# Patient Record
Sex: FEMALE | Race: WHITE | URBAN - METROPOLITAN AREA
[De-identification: names, ages, dates, MRNs, and addresses within clinical notes are randomized per-mention and may not be internally consistent; named-entity substitution may affect disease eponyms.]

---

## 2022-09-14 ENCOUNTER — WEB ENCOUNTER (OUTPATIENT)
Dept: URBAN - METROPOLITAN AREA CLINIC 96 | Facility: CLINIC | Age: 72
End: 2022-09-14

## 2022-09-14 ENCOUNTER — OFFICE VISIT (OUTPATIENT)
Dept: URBAN - METROPOLITAN AREA CLINIC 96 | Facility: CLINIC | Age: 72
End: 2022-09-14
Payer: MEDICARE

## 2022-09-14 ENCOUNTER — TELEPHONE ENCOUNTER (OUTPATIENT)
Dept: URBAN - METROPOLITAN AREA CLINIC 92 | Facility: CLINIC | Age: 72
End: 2022-09-14

## 2022-09-14 VITALS
SYSTOLIC BLOOD PRESSURE: 146 MMHG | HEART RATE: 67 BPM | BODY MASS INDEX: 24.32 KG/M2 | TEMPERATURE: 98 F | DIASTOLIC BLOOD PRESSURE: 83 MMHG | HEIGHT: 65 IN | WEIGHT: 146 LBS

## 2022-09-14 DIAGNOSIS — K58.0 IRRITABLE BOWEL SYNDROME WITH DIARRHEA: ICD-10-CM

## 2022-09-14 DIAGNOSIS — R68.81 EARLY SATIETY: ICD-10-CM

## 2022-09-14 DIAGNOSIS — R10.13 DYSPEPSIA: ICD-10-CM

## 2022-09-14 DIAGNOSIS — R14.0 BLOATING: ICD-10-CM

## 2022-09-14 PROCEDURE — 99204 OFFICE O/P NEW MOD 45 MIN: CPT | Performed by: INTERNAL MEDICINE

## 2022-09-14 RX ORDER — ESTRADIOL 1 MG/1
1 TABLET TABLET ORAL ONCE A DAY
Status: ACTIVE | COMMUNITY

## 2022-09-14 RX ORDER — HYOSCYAMINE SULFATE 0.12 MG/1
1 TABLET AS NEEDED TABLET ORAL
Qty: 45 TABLET | Refills: 1 | OUTPATIENT
Start: 2022-09-14 | End: 2022-11-12

## 2022-09-14 NOTE — HPI-TODAY'S VISIT:
Patient being seen in consultation as requested by Dr. Lakeisha Russell.  A copy of this document will be sent to the requesting physician.  73 yo female reports bloating and early satiety. "I just need help". Reports will have diarrhea intermittently. Last colonoscopy 2018 with outside physician to follow up abnormal Cologuard, unable to complete exam and follow up CT colonography was unremarkable with hepatic hemangioma noted on follow up liver MRI.   Patient takes daily probiotic, avoids gluten intake given will cause diarrhea. Will have cramping followed by diarrhea. Will panic with traveling. No rectal bleeding, no unintentional weight loss. No melena, no f/c, no jaundice. No nocturnal symptoms.   No prior celiac disease testing. GFD for years.   No caffeine, no alcohol, avoids cheeses, drinks lactose free milk. Avoids certain vegetables.  Hx of urinary spasm, followed by urologist. No hx of difficulty with urine flow. No glaucoma.

## 2022-10-25 ENCOUNTER — OFFICE VISIT (OUTPATIENT)
Dept: URBAN - METROPOLITAN AREA CLINIC 96 | Facility: CLINIC | Age: 72
End: 2022-10-25
Payer: MEDICARE

## 2022-10-25 ENCOUNTER — TELEPHONE ENCOUNTER (OUTPATIENT)
Dept: URBAN - METROPOLITAN AREA CLINIC 96 | Facility: CLINIC | Age: 72
End: 2022-10-25

## 2022-10-25 ENCOUNTER — WEB ENCOUNTER (OUTPATIENT)
Dept: URBAN - METROPOLITAN AREA CLINIC 96 | Facility: CLINIC | Age: 72
End: 2022-10-25

## 2022-10-25 ENCOUNTER — DASHBOARD ENCOUNTERS (OUTPATIENT)
Age: 72
End: 2022-10-25

## 2022-10-25 VITALS
DIASTOLIC BLOOD PRESSURE: 66 MMHG | WEIGHT: 143 LBS | BODY MASS INDEX: 23.82 KG/M2 | TEMPERATURE: 94.5 F | SYSTOLIC BLOOD PRESSURE: 123 MMHG | HEIGHT: 65 IN | HEART RATE: 70 BPM

## 2022-10-25 DIAGNOSIS — K58.0 IRRITABLE BOWEL SYNDROME WITH DIARRHEA: ICD-10-CM

## 2022-10-25 DIAGNOSIS — R14.0 BLOATING: ICD-10-CM

## 2022-10-25 PROBLEM — 197125005: Status: ACTIVE | Noted: 2022-09-14

## 2022-10-25 PROCEDURE — 99213 OFFICE O/P EST LOW 20 MIN: CPT

## 2022-10-25 RX ORDER — HYOSCYAMINE SULFATE 0.12 MG/1
1 TABLET AS NEEDED TABLET ORAL
Qty: 45 TABLET | Refills: 1 | Status: ACTIVE | COMMUNITY
Start: 2022-09-14 | End: 2022-11-12

## 2022-10-25 RX ORDER — ESTRADIOL 1 MG/1
1 TABLET TABLET ORAL ONCE A DAY
Status: ACTIVE | COMMUNITY

## 2022-10-25 NOTE — HPI-TODAY'S VISIT:
Patient is a 72-year-old female presents to follow-up from recent office visit with Dr. Johnson on 9/14/2022.   She initially presented with early satiety and intermittent diarrhea.   Last colonoscopy 2018 with outside physician after an abnormal Cologuard.  She was unable to complete exam and had a follow-up CT colonography which was unremarkable.  Hepatic hemangioma noted on follow-up liver MRI.  Known trigger for diarrhea includes gluten and has been gluten-free for years.   At last visit she was advised to attempt the FODMAP diet.   Asked to try probiotics and IBgard as needed.  Also provided with Levsin for abdominal bloating. Has removed chocolate and soy from diet which she feels has improved symptoms  She took Levsin which did improve symptoms Currently taking one once a day Tried IBgard and does not believe it helped so it was discontinued  Patient states the last couple of days she had had solid stools described as soft  Denies BRBPR or melena Denies current complaint sof abdominal pain, early satiety Denies N/V Denies dysphagia  Denies unintentional weight loss  Denies fever or chills

## 2022-11-01 ENCOUNTER — TELEPHONE ENCOUNTER (OUTPATIENT)
Dept: URBAN - METROPOLITAN AREA CLINIC 96 | Facility: CLINIC | Age: 72
End: 2022-11-01

## 2022-11-02 ENCOUNTER — TELEPHONE ENCOUNTER (OUTPATIENT)
Dept: URBAN - METROPOLITAN AREA CLINIC 96 | Facility: CLINIC | Age: 72
End: 2022-11-02

## 2022-11-02 ENCOUNTER — ERX REFILL RESPONSE (OUTPATIENT)
Dept: URBAN - METROPOLITAN AREA CLINIC 96 | Facility: CLINIC | Age: 72
End: 2022-11-02

## 2022-11-02 RX ORDER — HYOSCYAMINE SULFATE 0.12 MG/1
1 TABLET TABLET ORAL
Qty: 90 | Refills: 3
Start: 2022-09-14 | End: 2023-03-02

## 2022-11-02 RX ORDER — HYOSCYAMINE SULFATE 0.12 MG/1
TAKE 1 TABLET BY MOUTH EVERY 4 HOURS AS NEEDED TABLET ORAL
Qty: 45 TABLET | Refills: 0 | OUTPATIENT

## 2022-12-14 ENCOUNTER — TELEPHONE ENCOUNTER (OUTPATIENT)
Dept: URBAN - METROPOLITAN AREA CLINIC 96 | Facility: CLINIC | Age: 72
End: 2022-12-14

## 2022-12-14 RX ORDER — HYOSCYAMINE SULFATE 0.12 MG/1
TAKE 1 TABLET BY MOUTH EVERY 4 HOURS AS NEEDED TABLET ORAL
Qty: 45 TABLET | Refills: 4

## 2025-07-21 ENCOUNTER — OFFICE VISIT (OUTPATIENT)
Dept: URBAN - METROPOLITAN AREA CLINIC 96 | Facility: CLINIC | Age: 75
End: 2025-07-21
Payer: MEDICARE

## 2025-07-21 DIAGNOSIS — Z86.0101 H/O ADENOMATOUS POLYP OF COLON: ICD-10-CM

## 2025-07-21 DIAGNOSIS — R19.7 DIARRHEA, UNSPECIFIED TYPE: ICD-10-CM

## 2025-07-21 DIAGNOSIS — C55 MALIGNANT NEOPLASM OF UTERUS, UNSPECIFIED SITE: ICD-10-CM

## 2025-07-21 PROBLEM — 371973000: Status: ACTIVE | Noted: 2025-07-21

## 2025-07-21 PROCEDURE — 99214 OFFICE O/P EST MOD 30 MIN: CPT | Performed by: INTERNAL MEDICINE

## 2025-07-21 PROCEDURE — 99204 OFFICE O/P NEW MOD 45 MIN: CPT | Performed by: INTERNAL MEDICINE

## 2025-07-21 RX ORDER — ESTRADIOL 1 MG/1
1 TABLET TABLET ORAL ONCE A DAY
Status: DISCONTINUED | COMMUNITY

## 2025-07-21 RX ORDER — HYOSCYAMINE SULFATE 0.12 MG/1
TAKE 1 TABLET BY MOUTH EVERY 4 HOURS AS NEEDED TABLET ORAL
Qty: 45 TABLET | Refills: 4 | Status: DISCONTINUED | COMMUNITY

## 2025-07-21 NOTE — PHYSICAL EXAM CONSTITUTIONAL:
well developed, well nourished , in no acute distress , ambulating without difficulty , normal communication ability, kyphosis

## 2025-07-21 NOTE — HPI-TODAY'S VISIT:
10/5/2022 seen by PA.  75 year-old female prior colonoscopy 2018 with outside physician after an abnormal Cologuard.  She was unable to complete exam and had a follow-up CT colonography which was unremarkable.  Hepatic hemangioma noted on follow-up liver MRI.    Known trigger for diarrhea includes gluten and has been gluten-free for years. She took Levsin which did improve symptoms.  Leigh Llanos, a 75-year-old female, presented for follow-up of chronic diarrhea and to discuss colon cancer screening. She reported a longstanding pattern of loose stools, worsened by certain foods such as dairy, and noted that these symptoms have become more limiting since her snf in 2016. She denied blood in her stools and unintentional weight loss, but described occasional abdominal pain during severe diarrhea episodes that resolves spontaneously. To manage her symptoms, she avoids dietary triggers and uses an exercise bike for activity, given her history of knee replacement and swelling.  Leigh also expressed concern about her history of colon polyps and the incomplete colonoscopy in 2018, which was a traumatic experience. She is uncertain about the need for repeat colonoscopy but understands the importance of surveillance, especially given her prior abnormal Cologuard and history of uterine cancer with hysterectomy and radiation. Previous imaging, including MRI of the liver and spine, showed only benign findings and scoliosis.